# Patient Record
Sex: MALE | Race: WHITE | NOT HISPANIC OR LATINO | Employment: FULL TIME | URBAN - METROPOLITAN AREA
[De-identification: names, ages, dates, MRNs, and addresses within clinical notes are randomized per-mention and may not be internally consistent; named-entity substitution may affect disease eponyms.]

---

## 2017-05-15 ENCOUNTER — HOSPITAL ENCOUNTER (EMERGENCY)
Facility: MEDICAL CENTER | Age: 27
End: 2017-05-15
Attending: EMERGENCY MEDICINE
Payer: MEDICARE

## 2017-05-15 VITALS
TEMPERATURE: 98.1 F | DIASTOLIC BLOOD PRESSURE: 72 MMHG | HEART RATE: 61 BPM | RESPIRATION RATE: 16 BRPM | OXYGEN SATURATION: 95 % | SYSTOLIC BLOOD PRESSURE: 113 MMHG | HEIGHT: 69 IN | WEIGHT: 141.76 LBS | BODY MASS INDEX: 21 KG/M2

## 2017-05-15 DIAGNOSIS — R10.13 EPIGASTRIC PAIN: ICD-10-CM

## 2017-05-15 LAB
ALBUMIN SERPL BCP-MCNC: 4.9 G/DL (ref 3.2–4.9)
ALBUMIN/GLOB SERPL: 1.5 G/DL
ALP SERPL-CCNC: 68 U/L (ref 30–99)
ALT SERPL-CCNC: 9 U/L (ref 2–50)
ANION GAP SERPL CALC-SCNC: 9 MMOL/L (ref 0–11.9)
AST SERPL-CCNC: 15 U/L (ref 12–45)
BASOPHILS # BLD AUTO: 0.2 % (ref 0–1.8)
BASOPHILS # BLD: 0.02 K/UL (ref 0–0.12)
BILIRUB SERPL-MCNC: 0.9 MG/DL (ref 0.1–1.5)
BUN SERPL-MCNC: 10 MG/DL (ref 8–22)
CALCIUM SERPL-MCNC: 10.5 MG/DL (ref 8.5–10.5)
CHLORIDE SERPL-SCNC: 103 MMOL/L (ref 96–112)
CO2 SERPL-SCNC: 24 MMOL/L (ref 20–33)
CREAT SERPL-MCNC: 1.02 MG/DL (ref 0.5–1.4)
EOSINOPHIL # BLD AUTO: 0.01 K/UL (ref 0–0.51)
EOSINOPHIL NFR BLD: 0.1 % (ref 0–6.9)
ERYTHROCYTE [DISTWIDTH] IN BLOOD BY AUTOMATED COUNT: 35.1 FL (ref 35.9–50)
GFR SERPL CREATININE-BSD FRML MDRD: >60 ML/MIN/1.73 M 2
GLOBULIN SER CALC-MCNC: 3.2 G/DL (ref 1.9–3.5)
GLUCOSE SERPL-MCNC: 109 MG/DL (ref 65–99)
HCT VFR BLD AUTO: 48 % (ref 42–52)
HGB BLD-MCNC: 16.8 G/DL (ref 14–18)
IMM GRANULOCYTES # BLD AUTO: 0.05 K/UL (ref 0–0.11)
IMM GRANULOCYTES NFR BLD AUTO: 0.5 % (ref 0–0.9)
LIPASE SERPL-CCNC: 19 U/L (ref 11–82)
LYMPHOCYTES # BLD AUTO: 1.05 K/UL (ref 1–4.8)
LYMPHOCYTES NFR BLD: 9.8 % (ref 22–41)
MCH RBC QN AUTO: 29.6 PG (ref 27–33)
MCHC RBC AUTO-ENTMCNC: 35 G/DL (ref 33.7–35.3)
MCV RBC AUTO: 84.5 FL (ref 81.4–97.8)
MONOCYTES # BLD AUTO: 0.38 K/UL (ref 0–0.85)
MONOCYTES NFR BLD AUTO: 3.6 % (ref 0–13.4)
NEUTROPHILS # BLD AUTO: 9.19 K/UL (ref 1.82–7.42)
NEUTROPHILS NFR BLD: 85.8 % (ref 44–72)
NRBC # BLD AUTO: 0 K/UL
NRBC BLD AUTO-RTO: 0 /100 WBC
PLATELET # BLD AUTO: 264 K/UL (ref 164–446)
PMV BLD AUTO: 9.6 FL (ref 9–12.9)
POTASSIUM SERPL-SCNC: 4.5 MMOL/L (ref 3.6–5.5)
PROT SERPL-MCNC: 8.1 G/DL (ref 6–8.2)
RBC # BLD AUTO: 5.68 M/UL (ref 4.7–6.1)
SODIUM SERPL-SCNC: 136 MMOL/L (ref 135–145)
WBC # BLD AUTO: 10.7 K/UL (ref 4.8–10.8)

## 2017-05-15 PROCEDURE — 83690 ASSAY OF LIPASE: CPT

## 2017-05-15 PROCEDURE — 99284 EMERGENCY DEPT VISIT MOD MDM: CPT

## 2017-05-15 PROCEDURE — 36415 COLL VENOUS BLD VENIPUNCTURE: CPT

## 2017-05-15 PROCEDURE — 700102 HCHG RX REV CODE 250 W/ 637 OVERRIDE(OP): Performed by: EMERGENCY MEDICINE

## 2017-05-15 PROCEDURE — A9270 NON-COVERED ITEM OR SERVICE: HCPCS | Performed by: EMERGENCY MEDICINE

## 2017-05-15 PROCEDURE — 80053 COMPREHEN METABOLIC PANEL: CPT

## 2017-05-15 PROCEDURE — 85025 COMPLETE CBC W/AUTO DIFF WBC: CPT

## 2017-05-15 RX ORDER — PROMETHAZINE HYDROCHLORIDE 25 MG/1
25 TABLET ORAL ONCE
Status: COMPLETED | OUTPATIENT
Start: 2017-05-15 | End: 2017-05-15

## 2017-05-15 RX ORDER — PROMETHAZINE HYDROCHLORIDE 25 MG/1
25 TABLET ORAL EVERY 6 HOURS PRN
Qty: 30 TAB | Refills: 0 | Status: SHIPPED | OUTPATIENT
Start: 2017-05-15

## 2017-05-15 RX ADMIN — PROMETHAZINE HYDROCHLORIDE 25 MG: 25 TABLET ORAL at 17:41

## 2017-05-15 ASSESSMENT — PAIN SCALES - GENERAL: PAINLEVEL_OUTOF10: 7

## 2017-05-15 NOTE — ED NOTES
25 y/o male ambulatory to triage with c/o intermittent vomiting that occurs at random intervals associated with abd pain. Pt states this occurs several times per month over the last few years and he is unable to corolate the episodes with anything he has consumed.

## 2017-05-15 NOTE — ED AVS SNAPSHOT
RushFiles Access Code: [email or web address]  Expires: 6/14/2017  5:59 PM    Your email address is not on file at [facility].  Email Addresses are required for you to sign up for RushFiles, please contact [phone] to verify your personal information and to provide your email address prior to attempting to register for RushFiles.    Dillan Cano  [address] [place] [address] [address]  [place] [address]    RushFiles  A secure, online tool to manage your health information     [facility]’s RushFiles® is a secure, online tool that connects you to your personalized health information from the privacy of your home -- day or night - making it very easy for you to manage your healthcare. Once the activation process is completed, you can even access your medical information using the RushFiles colton, which is available for free in the Apple Colton store or Google Play store.     To learn more about RushFiles, visit [email or web address]    There are two levels of access available (as shown below):   My Chart Features  [facility] Primary Care Doctor [facility]  Specialists [facility]  Urgent  Care Non-[facility] Primary Care Doctor   Email your healthcare team securely and privately 24/7 X X X    Manage appointments: schedule your next appointment; view details of past/upcoming appointments X      Request prescription refills. X      View recent personal medical records, including lab and immunizations X X X X   View health record, including health history, allergies, medications X X X X   Read reports about your outpatient visits, procedures, consult and ER notes X X X X   See your discharge summary, which is a recap of your hospital and/or ER visit that includes your diagnosis, lab results, and care plan X X  X     How to register for MyWebzzt:  Once your e-mail address has been verified, follow the following steps to sign up for RushFiles.     1. Go to  [email or web address]  2. Click on the Sign Up Now box, which takes you to the New Member Sign Up page. You will need  to provide the following information:  a. Enter your Stroho Access Code exactly as it appears at the top of this page. (You will not need to use this code after you’ve completed the sign-up process. If you do not sign up before the expiration date, you must request a new code.)   b. Enter your date of birth.   c. Enter your home email address.   d. Click Submit, and follow the next screen’s instructions.  3. Create a Stroho ID. This will be your Stroho login ID and cannot be changed, so think of one that is secure and easy to remember.  4. Create a Stroho password. You can change your password at any time.  5. Enter your Password Reset Question and Answer. This can be used at a later time if you forget your password.   6. Enter your e-mail address. This allows you to receive e-mail notifications when new information is available in Stroho.  7. Click Sign Up. You can now view your health information.    For assistance activating your Stroho account, call [phone]

## 2017-05-15 NOTE — ED AVS SNAPSHOT
Home Care Instructions                                                                                                                Dillan Cano   MRN: [id number]    Department:  [facility], Emergency Dept   Date of Visit:  5/15/2017            [facility], Emergency Dept    [address] [place] [id number]    Phone:  [phone]      You were seen by     Lino Beckham M.D.      Your Diagnosis Was     Epigastric pain     R10.13       These are the medications you received during your hospitalization from 05/15/2017 1354 to 05/15/2017 1759     Date/Time Order Dose Route Action    05/15/2017 1741 promethazine (PHENERGAN) tablet 25 mg 25 mg Oral Given      Follow-up Information     1. Follow up with [facility], Emergency Dept.    Specialty:  Emergency Medicine    Why:  If symptoms worsen    Contact information    [address] [place] [address]  [facility] [address] [phone]        2. Follow up with [facility] In 3 days.    Contact information    [facility] [address]  [phone]        Medication Information     Review all of your home medications and newly ordered medications with your primary doctor and/or pharmacist as soon as possible. Follow medication instructions as directed by your doctor and/or pharmacist.     Please keep your complete medication list with you and share with your physician. Update the information when medications are discontinued, doses are changed, or new medications (including over-the-counter products) are added; and carry medication information at all times in the event of emergency situations.               Medication List      START taking these medications        Instructions    Morning Afternoon Evening Bedtime    promethazine 25 MG Tabs   Last time this was given:  25 mg on 5/15/2017  5:41 PM   Commonly known as:  PHENERGAN        Take 1 Tab by mouth every 6 hours as needed for Nausea/Vomiting.   Dose:  25  mg                          ASK your doctor about these medications        Instructions    Morning Afternoon Evening Bedtime    doxycycline 100 MG Tabs   Commonly known as:  VIBRAMYCIN        Take 1 Tab by mouth 2 times a day. With food   Dose:  100 mg                        guaifenesin-codeine 100-10 MG/5ML syrup   Commonly known as:  TUSSI-ORGANIDIN NR        Take 10 mL by mouth 4 times a day as needed.   Dose:  10 mL                        ROBITUSSIN CF PO        Take  by mouth as needed.                             Where to Get Your Medications      You can get these medications from any pharmacy     Bring a paper prescription for each of these medications    - promethazine 25 MG Tabs            Procedures and tests performed during your visit     CBC WITH DIFFERENTIAL    COMP METABOLIC PANEL    ESTIMATED GFR    LIPASE        Discharge Instructions       Refrain from further marijuana use  Follow-up with a gastroenterologist appear not better in 48-72 hours  Return here if you are acutely worse          Patient Information     Patient Information    Following emergency treatment: all patient requiring follow-up care must return either to a private physician or a clinic if your condition worsens before you are able to obtain further medical attention, please return to the emergency room.     Billing Information    At [facility], we work to make the billing process streamlined for our patients.  Our Representatives are here to answer any questions you may have regarding your hospital bill.  If you have insurance coverage and have supplied your insurance information to us, we will submit a claim to your insurer on your behalf.  Should you have any questions regarding your bill, we can be reached online or by phone as follows:  Online: You are able pay your bills online or live chat with our representatives about any billing questions you may have. We are here to help Monday - Friday from 8:00am to 7:30pm and  9:00am - 12:00pm on Saturdays.  Please visit [email or web address]  for more information.   Phone:  [phone] or [phone]    Please note that your emergency physician, surgeon, pathologist, radiologist, anesthesiologist, and other specialists are not employed by [facility] and will therefore bill separately for their services.  Please contact them directly for any questions concerning their bills at the numbers below:     Emergency Physician Services:  [phone]  [facility]:  [phone]  [facility]:  [phone]  [facility]:  [phone]    1. Your final bill may vary from the amount quoted upon discharge if all procedures are not complete at that time, or if your doctor has additional procedures of which we are not aware. You will receive an additional bill if you return to the Emergency Department at [facility] for suture removal regardless of the facility of which the sutures were placed.     2. Please arrange for settlement of this account at the emergency registration.    3. All self-pay accounts are due in full at the time of treatment.  If you are unable to meet this obligation then payment is expected within 4-5 days.     4. If you have had radiology studies (CT, X-ray, Ultrasound, MRI), you have received a preliminary result during your emergency department visit. Please contact the radiology department [phone] to receive a copy of your final result. Please discuss the Final result with your primary physician or with the follow up physician provided.     Crisis Hotline:  [place] Crisis Hotline:  3-196-MQVUZPF or 1-451.808.5548  [place] Crisis Hotline:    1-342.890.5026 or 303-385-6797         ED Discharge Follow Up Questions    1. In order to provide you with very good care, we would like to follow up with a phone call in the next few days.  May we have your permission to contact you?     YES /   NO    2. What is the best phone number to call you? (       )_____-__________    3. What is the best time to call you?      Morning  /  Afternoon  /  Evening                   Patient Signature:  ____________________________________________________________    Date:  ____________________________________________________________

## 2017-05-15 NOTE — ED AVS SNAPSHOT
5/15/2017    Dillan Cano  [address] [place] [address] [address]  [place] [address]    Dear Dillan:    [facility] wants to ensure your discharge home is safe and you or your loved ones have had all of your questions answered regarding your care after you leave the hospital.    Below is a list of resources and contact information should you have any questions regarding your hospital stay, follow-up instructions, or active medical symptoms.    Questions or Concerns Regarding… Contact   Medical Questions Related to Your Discharge  (7 days a week, 8am-5pm) Contact a Nurse Care Coordinator   [phone]   Medical Questions Not Related to Your Discharge  (24 hours a day / 7 days a week)  Contact the Nurse Health Line   [phone]    Medications or Discharge Instructions Refer to your discharge packet   or contact your [facility] Primary Care Provider   [phone]   Follow-up Appointment(s) Schedule your appointment via [employer]   or contact Scheduling [phone]   Billing Review your statement via [employer]  or contact Billing [phone]   Medical Records Review your records via [employer]   or contact Medical Records [phone]     You may receive a telephone call within two days of discharge. This call is to make certain you understand your discharge instructions and have the opportunity to have any questions answered. You can also easily access your medical information, test results and upcoming appointments via the ITADSecurity free online health management tool. You can learn more and sign up at [email or web address]. For assistance setting up your ITADSecurity account, please call [phone].    Once again, we want to ensure your discharge home is safe and that you have a clear understanding of any next steps in your care. If you have any questions or concerns, please do not hesitate to contact us, we are here for you. Thank you for choosing [facility] for your healthcare needs.    Sincerely,    Your [facility] Healthcare Team

## 2017-05-15 NOTE — ED NOTES
Pt called back to triage for protocols.  ER protocol process explained to pt, including that pt may need IV and further testing once in room and seen by ERP.  Pt agreeable to start protocols.  Labs drawn and sent.  Pt provided with instruction and supplies for clean catch urine specimen.

## 2017-05-16 NOTE — ED NOTES
Discharge instructions explained to patient; patient verbalized understanding. Patient given paper prescription. Patient given work note. Vital signs WNL upon discharge. Patient ambulatory w/ steady gait upon leaving ER w/ s/o.

## 2017-05-16 NOTE — ED PROVIDER NOTES
"ED Provider Note    CHIEF COMPLAINT  Chief Complaint   Patient presents with   • Vomiting     intermittent   • Abdominal Pain       HPI  Dillan Cano is a 26 y.o. male who presents to the emergency department with abdominal discomfort. The patient states that intermittent abdominal pain and vomiting over the last year. He states that he is unaware of any exacerbating or relieving factors. He has had associated anorexia as well as nausea in the morning. He has not had any prior abdominal surgeries. He does admit to frequent marijuana use to help stimulate his appetite. At this time he states he has mild discomfort. He did have some emesis prior to arrival.    REVIEW OF SYSTEMS  See HPI for further details. All other systems are negative.     PAST MEDICAL HISTORY  Past Medical History   Diagnosis Date   • Cerebral Palsy      \"limited\"       SOCIAL HISTORY  Social History     Social History   • Marital Status: Single     Spouse Name: N/A   • Number of Children: N/A   • Years of Education: N/A     Social History Main Topics   • Smoking status: Never Smoker    • Smokeless tobacco: Not on file   • Alcohol Use: No   • Drug Use: No   • Sexual Activity: Not on file     Other Topics Concern   • Not on file     Social History Narrative   • No narrative on file           PHYSICAL EXAM  VITAL SIGNS: /67 mmHg  Pulse 115  Temp(Src) 36.7 °C (98.1 °F) (Temporal)  Resp 20  Ht 1.753 m (5' 9.02\")  Wt 64.3 kg (141 lb 12.1 oz)  BMI 20.92 kg/m2  SpO2 95%  Constitutional: Well developed, Well nourished, No acute distress, Non-toxic appearance.   HENT: Normocephalic, Atraumatic, tympanic membranes are intact and nonerythematous bilaterally, Oropharynx moist without exudates or erythema, Nose normal.   Eyes: PERRLA, EOMI, Conjunctiva normal.  Neck: Supple without meningismus  Lymphatic: No lymphadenopathy noted.   Cardiovascular: Slightly tachycardic.   Thorax & Lungs: Normal breath sounds, No respiratory distress, No " wheezing, No chest tenderness.   Abdomen: Epigastric discomfort to deep palpation, no rebound, no guarding  Skin: Warm, Dry, No erythema, No rash.   Back: No tenderness, No CVA tenderness.   Extremities: Atraumatic with symmetric distal pulses, No edema, No tenderness, No cyanosis, No clubbing.   Neurologic: Alert & oriented x 3, cranial nerves II through XII are intact, Normal motor function, Normal sensory function, No focal deficits noted.   Psychiatric: Affect normal, Judgment normal, Mood normal.     COURSE & MEDICAL DECISION MAKING  Pertinent Labs & Imaging studies reviewed. (See chart for details)  This a 26-year-old male who presents to emergency department with abdominal pain and vomiting. I suspect his developed gastroparesis from his marijuana abuse. The bladder does not show any significant abnormalities. The patient be treated with Phenergan and is given clear instructions to stop utilizing marijuana. If he does not respond he will follow-up to gastroenterologist for further outpatient management. The patient will return here if he is acutely worse.    FINAL IMPRESSION  1. Abdominal pain suspect secondary to gastroparesis  2. Marijuana abuse     Disposition  The patient will be discharged in stable condition    Electronically signed by: Lino Beckham, 5/15/2017 5:32 PM

## 2017-05-16 NOTE — DISCHARGE INSTRUCTIONS
Refrain from further marijuana use  Follow-up with a gastroenterologist appear not better in 48-72 hours  Return here if you are acutely worse

## 2021-08-25 ENCOUNTER — HOSPITAL ENCOUNTER (EMERGENCY)
Dept: HOSPITAL 8 - ED | Age: 31
Discharge: HOME | End: 2021-08-25
Payer: COMMERCIAL

## 2021-08-25 VITALS — BODY MASS INDEX: 21.32 KG/M2 | HEIGHT: 69 IN | WEIGHT: 143.96 LBS

## 2021-08-25 VITALS — DIASTOLIC BLOOD PRESSURE: 62 MMHG | SYSTOLIC BLOOD PRESSURE: 90 MMHG

## 2021-08-25 DIAGNOSIS — K92.0: Primary | ICD-10-CM

## 2021-08-25 DIAGNOSIS — R00.0: ICD-10-CM

## 2021-08-25 DIAGNOSIS — F17.200: ICD-10-CM

## 2021-08-25 LAB
ALBUMIN SERPL-MCNC: 3.6 G/DL (ref 3.4–5)
ALP SERPL-CCNC: 50 U/L (ref 45–117)
ALT SERPL-CCNC: 18 U/L (ref 12–78)
ANION GAP SERPL CALC-SCNC: 6 MMOL/L (ref 5–15)
BASOPHILS # BLD AUTO: 0.1 X10^3/UL (ref 0–0.1)
BASOPHILS NFR BLD AUTO: 1 % (ref 0–1)
BILIRUB SERPL-MCNC: 0.6 MG/DL (ref 0.2–1)
CALCIUM SERPL-MCNC: 9.2 MG/DL (ref 8.5–10.1)
CHLORIDE SERPL-SCNC: 103 MMOL/L (ref 98–107)
CREAT SERPL-MCNC: 0.86 MG/DL (ref 0.7–1.3)
EOSINOPHIL # BLD AUTO: 0 X10^3/UL (ref 0–0.4)
EOSINOPHIL NFR BLD AUTO: 0 % (ref 1–7)
ERYTHROCYTE [DISTWIDTH] IN BLOOD BY AUTOMATED COUNT: 12.7 % (ref 9.4–14.8)
INR PPP: 1 (ref 0.93–1.1)
LYMPHOCYTES # BLD AUTO: 3.6 X10^3/UL (ref 1–3.4)
LYMPHOCYTES NFR BLD AUTO: 24 % (ref 22–44)
MCH RBC QN AUTO: 30.5 PG (ref 27.5–34.5)
MCHC RBC AUTO-ENTMCNC: 34.4 G/DL (ref 33.2–36.2)
MONOCYTES # BLD AUTO: 1.2 X10^3/UL (ref 0.2–0.8)
MONOCYTES NFR BLD AUTO: 8 % (ref 2–9)
NEUTROPHILS # BLD AUTO: 10.2 X10^3/UL (ref 1.8–6.8)
NEUTROPHILS NFR BLD AUTO: 68 % (ref 42–75)
PLATELET # BLD AUTO: 292 X10^3/UL (ref 130–400)
PMV BLD AUTO: 8.3 FL (ref 7.4–10.4)
PROT SERPL-MCNC: 7 G/DL (ref 6.4–8.2)
PROTHROMBIN TIME: 10.7 SECONDS (ref 9.6–11.5)
RBC # BLD AUTO: 4.02 X10^6/UL (ref 4.38–5.82)

## 2021-08-25 PROCEDURE — 99285 EMERGENCY DEPT VISIT HI MDM: CPT

## 2021-08-25 PROCEDURE — 36415 COLL VENOUS BLD VENIPUNCTURE: CPT

## 2021-08-25 PROCEDURE — 85025 COMPLETE CBC W/AUTO DIFF WBC: CPT

## 2021-08-25 PROCEDURE — 85610 PROTHROMBIN TIME: CPT

## 2021-08-25 PROCEDURE — 96372 THER/PROPH/DIAG INJ SC/IM: CPT

## 2021-08-25 PROCEDURE — 96374 THER/PROPH/DIAG INJ IV PUSH: CPT

## 2021-08-25 PROCEDURE — 74177 CT ABD & PELVIS W/CONTRAST: CPT

## 2021-08-25 PROCEDURE — 86900 BLOOD TYPING SEROLOGIC ABO: CPT

## 2021-08-25 PROCEDURE — 96375 TX/PRO/DX INJ NEW DRUG ADDON: CPT

## 2021-08-25 PROCEDURE — 83690 ASSAY OF LIPASE: CPT

## 2021-08-25 PROCEDURE — 80053 COMPREHEN METABOLIC PANEL: CPT

## 2021-08-25 PROCEDURE — 86850 RBC ANTIBODY SCREEN: CPT

## 2021-08-25 PROCEDURE — 96361 HYDRATE IV INFUSION ADD-ON: CPT

## 2021-08-25 PROCEDURE — C9113 INJ PANTOPRAZOLE SODIUM, VIA: HCPCS

## 2021-08-25 NOTE — NUR
PATIENT WALKED BACK FROM TRIAGE WITH CHIEF C/O HEMATEMESIS X2 DAYS. PATIENT 
REPORT INTERMITTENT MID ABD PAIN. DENIES DIARRHEA, UNABLE TO EAT OR DRINK MUCH 
THE LAST 2 DAYS.  



PATIENT CONNECTED TO MONITOR, VSS, CALL LIGHT WITHIN REACH. FRIEND AT BEDSIDE.

## 2021-08-25 NOTE — NUR
IV removed with tip intact. Patient given discharge instructions and 
prescriptions and they have confirmed that they understand the instructions.  
Patient ambulatory with steady gait. NAD, all questions answered appropriately, 
denies additional needs at this time. No personal belongings left in room after 
discharge.